# Patient Record
Sex: FEMALE | Race: WHITE | Employment: OTHER | URBAN - METROPOLITAN AREA
[De-identification: names, ages, dates, MRNs, and addresses within clinical notes are randomized per-mention and may not be internally consistent; named-entity substitution may affect disease eponyms.]

---

## 2017-01-19 PROBLEM — M79.7 FIBROMYALGIA: Status: ACTIVE | Noted: 2017-01-19

## 2017-01-19 PROBLEM — F01.50 VASCULAR DEMENTIA (HCC): Status: ACTIVE | Noted: 2017-01-19

## 2017-01-19 PROBLEM — N32.81 OAB (OVERACTIVE BLADDER): Status: ACTIVE | Noted: 2017-01-19

## 2017-01-19 PROBLEM — G47.00 INSOMNIA: Status: ACTIVE | Noted: 2017-01-19

## 2017-01-19 PROBLEM — F41.9 ANXIETY DISORDER: Status: ACTIVE | Noted: 2017-01-19

## 2017-03-21 PROBLEM — M10.9 PODAGRA: Status: ACTIVE | Noted: 2017-03-21

## 2017-03-21 PROBLEM — E53.8 B12 DEFICIENCY: Status: ACTIVE | Noted: 2017-03-21

## 2017-04-24 PROBLEM — M10.9 PODAGRA: Status: RESOLVED | Noted: 2017-03-21 | Resolved: 2017-04-24

## 2017-07-27 PROBLEM — K58.9 IBS (IRRITABLE BOWEL SYNDROME): Status: ACTIVE | Noted: 2017-07-27

## 2017-07-27 PROBLEM — R11.0 NAUSEA: Status: ACTIVE | Noted: 2017-07-27

## 2017-09-14 PROBLEM — K52.9 COLITIS: Status: ACTIVE | Noted: 2017-09-14

## 2017-10-03 PROBLEM — K59.00 CONSTIPATION: Status: ACTIVE | Noted: 2017-10-03

## 2017-10-26 PROBLEM — N39.46 MIXED STRESS AND URGE URINARY INCONTINENCE: Status: ACTIVE | Noted: 2017-10-26

## 2018-04-27 ENCOUNTER — HOSPITAL ENCOUNTER (OUTPATIENT)
Age: 73
Setting detail: OUTPATIENT SURGERY
Discharge: HOME | End: 2018-04-27
Attending: INTERNAL MEDICINE | Admitting: INTERNAL MEDICINE
Payer: MEDICARE

## 2018-04-27 ENCOUNTER — ANESTHESIA (OUTPATIENT)
Dept: ENDOSCOPY | Age: 73
End: 2018-04-27
Payer: MEDICARE

## 2018-04-27 ENCOUNTER — ANESTHESIA EVENT (OUTPATIENT)
Dept: ENDOSCOPY | Age: 73
End: 2018-04-27
Payer: MEDICARE

## 2018-04-27 VITALS
RESPIRATION RATE: 18 BRPM | HEART RATE: 89 BPM | OXYGEN SATURATION: 98 % | WEIGHT: 146 LBS | SYSTOLIC BLOOD PRESSURE: 134 MMHG | DIASTOLIC BLOOD PRESSURE: 62 MMHG | BODY MASS INDEX: 24.32 KG/M2 | TEMPERATURE: 98.6 F | HEIGHT: 65 IN

## 2018-04-27 PROCEDURE — 88312 SPECIAL STAINS GROUP 1: CPT | Performed by: INTERNAL MEDICINE

## 2018-04-27 PROCEDURE — 74011000250 HC RX REV CODE- 250

## 2018-04-27 PROCEDURE — 76040000025: Performed by: INTERNAL MEDICINE

## 2018-04-27 PROCEDURE — 77030009426 HC FCPS BIOP ENDOSC BSC -B: Performed by: INTERNAL MEDICINE

## 2018-04-27 PROCEDURE — 88305 TISSUE EXAM BY PATHOLOGIST: CPT | Performed by: INTERNAL MEDICINE

## 2018-04-27 PROCEDURE — 76060000032 HC ANESTHESIA 0.5 TO 1 HR: Performed by: INTERNAL MEDICINE

## 2018-04-27 PROCEDURE — 74011250636 HC RX REV CODE- 250/636: Performed by: ANESTHESIOLOGY

## 2018-04-27 PROCEDURE — 74011250636 HC RX REV CODE- 250/636

## 2018-04-27 RX ORDER — PROPOFOL 10 MG/ML
INJECTION, EMULSION INTRAVENOUS
Status: DISCONTINUED | OUTPATIENT
Start: 2018-04-27 | End: 2018-04-27 | Stop reason: HOSPADM

## 2018-04-27 RX ORDER — LIDOCAINE HYDROCHLORIDE 20 MG/ML
INJECTION, SOLUTION EPIDURAL; INFILTRATION; INTRACAUDAL; PERINEURAL AS NEEDED
Status: DISCONTINUED | OUTPATIENT
Start: 2018-04-27 | End: 2018-04-27 | Stop reason: HOSPADM

## 2018-04-27 RX ORDER — SODIUM CHLORIDE 0.9 % (FLUSH) 0.9 %
5-10 SYRINGE (ML) INJECTION AS NEEDED
Status: CANCELLED | OUTPATIENT
Start: 2018-04-27

## 2018-04-27 RX ORDER — SODIUM CHLORIDE, SODIUM LACTATE, POTASSIUM CHLORIDE, CALCIUM CHLORIDE 600; 310; 30; 20 MG/100ML; MG/100ML; MG/100ML; MG/100ML
100 INJECTION, SOLUTION INTRAVENOUS CONTINUOUS
Status: DISCONTINUED | OUTPATIENT
Start: 2018-04-27 | End: 2018-04-27 | Stop reason: HOSPADM

## 2018-04-27 RX ORDER — SODIUM CHLORIDE, SODIUM LACTATE, POTASSIUM CHLORIDE, CALCIUM CHLORIDE 600; 310; 30; 20 MG/100ML; MG/100ML; MG/100ML; MG/100ML
100 INJECTION, SOLUTION INTRAVENOUS CONTINUOUS
Status: CANCELLED | OUTPATIENT
Start: 2018-04-27

## 2018-04-27 RX ORDER — PROPOFOL 10 MG/ML
INJECTION, EMULSION INTRAVENOUS AS NEEDED
Status: DISCONTINUED | OUTPATIENT
Start: 2018-04-27 | End: 2018-04-27 | Stop reason: HOSPADM

## 2018-04-27 RX ADMIN — PROPOFOL 120 MCG/KG/MIN: 10 INJECTION, EMULSION INTRAVENOUS at 07:15

## 2018-04-27 RX ADMIN — SODIUM CHLORIDE, SODIUM LACTATE, POTASSIUM CHLORIDE, AND CALCIUM CHLORIDE 100 ML/HR: 600; 310; 30; 20 INJECTION, SOLUTION INTRAVENOUS at 07:04

## 2018-04-27 RX ADMIN — PROPOFOL 20 MG: 10 INJECTION, EMULSION INTRAVENOUS at 07:14

## 2018-04-27 RX ADMIN — LIDOCAINE HYDROCHLORIDE 60 MG: 20 INJECTION, SOLUTION EPIDURAL; INFILTRATION; INTRACAUDAL; PERINEURAL at 07:13

## 2018-04-27 NOTE — ANESTHESIA PREPROCEDURE EVALUATION
Anesthetic History     PONV          Review of Systems / Medical History  Patient summary reviewed, nursing notes reviewed and pertinent labs reviewed    Pulmonary    COPD: moderate    Sleep apnea           Neuro/Psych     seizures: well controlled    Psychiatric history and dementia     Cardiovascular    Hypertension: well controlled          CAD    Exercise tolerance: <4 METS  Comments: MVP per patient    Echo from 2016 showed mild LVH, mild diastolic dysfunction, normal LVEF and valve function   GI/Hepatic/Renal     GERD: well controlled           Endo/Other        Arthritis     Other Findings            Physical Exam    Airway  Mallampati: II  TM Distance: 4 - 6 cm  Neck ROM: normal range of motion   Mouth opening: Normal     Cardiovascular    Rhythm: regular           Dental    Dentition: Edentulous     Pulmonary    Rhonchi    Wheezes         Abdominal         Other Findings            Anesthetic Plan    ASA: 3  Anesthesia type: total IV anesthesia

## 2018-04-27 NOTE — ANESTHESIA POSTPROCEDURE EVALUATION
Post-Anesthesia Evaluation and Assessment    Patient: Radha Mccormick MRN: [id number]  SSN: [id number]    YOB: 1945  Age: 72 y.o. Sex: female       Cardiovascular Function/Vital Signs  Visit Vitals    /66    Pulse 87    Temp 37 °C (98.6 °F)    Resp 16    Ht 5' 5\" (1.651 m)    Wt 66.2 kg (146 lb)    SpO2 97%    BMI 24.3 kg/m2       Patient is status post total IV anesthesia anesthesia for Procedure(s):  ESOPHAGOGASTRODUODENOSCOPY (EGD)  COLONOSCOPY BMI 24  ESOPHAGOGASTRODUODENAL (EGD) BIOPSY  COLON BIOPSY. Nausea/Vomiting: None    Postoperative hydration reviewed and adequate. Pain:  Pain Scale 1: Numeric (0 - 10) (04/27/18 0640)  Pain Intensity 1: 6 (04/27/18 0640)   Managed    Neurological Status: At baseline    Mental Status and Level of Consciousness: Arousable    Pulmonary Status:   O2 Device: Nasal cannula (04/27/18 0737)   Adequate oxygenation and airway patent    Complications related to anesthesia: None    Post-anesthesia assessment completed.  No concerns    Signed By: Arabella Tsai MD     April 27, 2018

## 2018-04-27 NOTE — DISCHARGE INSTRUCTIONS
Gastrointestinal Esophagogastroduodenoscopy (EGD) - Upper Exam Discharge Instructions    1. Call Dr. Coretta Newell at [phone] for any problems or questions. 2. Contact the doctor's office for follow up appointment as directed. 3. Medication may cause drowsiness for several hours, therefore, do not drive or operate machinery for remainder of the day. 4. No alcohol today. 5. Ordinarily, you may resume regular diet and activity after exam unless otherwise specified by your physician. 6. For mild soreness in your throat you may use Cepacol throat lozenges or warm salt-water gargles as needed. Any additional instructions:      - Follow up pathology and treat H pylori if positive. - Avoid ALL NSAIDs, Aspirin, Aleve, Advil, Ibuprofen. - Increase Nexium to BID x 8wks, then back to daily. Instructions given to [facility] and other family members. Instructions given by:  Dez Delgado RN              Gastrointestinal Colonoscopy/Flexible Sigmoidoscopy - Lower Exam Discharge Instructions  1. Call Dr. Coretta Newell at [phone] for any problems or questions. 2. Contact the doctors office for follow up appointment as directed  3. Medication may cause drowsiness for several hours, therefore, do not drive or operate machinery for remainder of the day. 4. No alcohol today. 5. Ordinarily, you may resume regular diet and activity after exam unless otherwise specified by your physician. 6. Because of air put into your colon during exam, you may experience some abdominal distension, relieved by the passage of gas, for several hours. 7. Contact your physician if you have any of the following:  a. Excessive amount of bleeding - large amount when having a bowel movement. Occasional specks of blood with bowel movement would not be unusual.  b. Severe abdominal pain  c.  Fever or Chills    Any additional instructions:      - Levaquin x 14 days  - After Antibiotics, start prednisone taper x 3 wks  - Will get CT after prednisone taper with OV to follow  - Low residue diet  - Follow up pathology    Instructions given to Justinesara Smiley and other family members.   Instructions given by:  Kun Frost RN

## 2018-04-27 NOTE — PROCEDURES
DATE OF PROCEDURE: 4/27/18    REQUESTING PHYSICIAN: Dr Anabel Galo: Esophagogastroduodenoscopy. ENDOSCOPIST: Dorys Almendarez M.D. PREOPERATIVE DIAGNOSIS: Nausea, Epigastric pain    POSTOPERATIVE DIAGNOSIS: Gastritis with multiple ulcers    INSTRUMENTS: GIF H190    SEDATION: MAC    DESCRIPTION: After informed consent was obtained, the patient was taken to the endoscopy suite and placed in the left lateral decubitus position. Intravenous sedation was administered as above and posterior pharynx was anesthetized with local anesthetic spray. After adequate sedation had been achieved the endoscope was inserted over the tongue and through the posterior pharynx under direct visualization down the esophagus to the stomach and into the second portion of the duodenum. The endoscopic was withdrawn from that point, performing a careful survey of the mucosa. Retroflexion was performed in the gastric fundus. FINDINGS:  Esophagus: Normal appearing esophageal mucosa. Stomach: Normal appearing proximal mucosa. There was moderate gastritis in the antrum with multiple shallow ulcerations. Bx's taken. Duodenum: Normal duodenal mucosa. Estimated blood loss:  0 cc-minimal    IMPRESSION:   Gastritis with gastric ulcers    PLAN:  - F/u path and treat H pylori if +  - Avoid ALL NSAIDs  - Increase Nexium to BID x 8wks, then back to daily  - Proceed to colo        PROCEDURE: Colonoscopy. PREOPERATIVE DIAGNOSIS: Diarrhea, Colitis seen on CT, FHx of Crohn's, RLQ pain    POSTOPERATIVE DIAGNOSIS: Rectosigmoid stricture    SEDATION: MAC    INSTRUMENT: PCF H190    DESCRIPTION OF PROCEDURE:  After informed consent was obtained the patient was placed in the left lateral decubitus position. Intravenous sedation was administered as above. After adequate sedation had been achieved, digital rectal examination was performed. The colonoscope was then inserted through the anus and followed the course of the rectum and sigmoid. The colonoscope was withdrawn from that point, performing a careful survey of the mucosa. Retroflexion was performed in the rectum. FINDINGS:  Normal rectal mucosa. There was a tight, ulcerated, inflamed stricture at the rectosigmoid junction. The peds scope couldn't traverse. I switched to an upper scope which likely could have traversed but it was so friable and inflamed, the perforation risk was very high. Therefore, bx's were taken and the procedure was aborted.      Estimated Blood Loss:  0 cc-min    IMPRESSION:  Rectosigmoid stricture     PLAN:  - Levaquin x 14 days  - After Abx, start prednisone taper x 3 wks  - Will get CT after prednisone taper with OV to follow  - Low residue diet  - F/u path    P Svetlana Mazariegos MD

## 2018-04-27 NOTE — IP AVS SNAPSHOT
[address] 
[phone] Patient: Vianney High MRN: [id number] MIKE:[id number] About your hospitalization You were admitted on:  April 27, 2018 You last received care in the:  [facility] You were discharged on:  April 27, 2018 Why you were hospitalized Your primary diagnosis was:  Not on File Follow-up Information None Your Scheduled Appointments Friday May 11, 2018  4:30 PM EDT Office Visit with Lakshmi Gunter MD  
[facility] CARDIOLOGY [place] OFFICE ([address] [place] [address]) [address] [place] [address]  
[phone] Discharge Orders None A check diego indicates which time of day the medication should be taken. My Medications ASK your doctor about these medications Instructions Each Dose to Equal  
 Morning Noon Evening Bedtime  
 amLODIPine 5 mg tablet Commonly known as:  Cathleen Calhoun Your last dose was: Your next dose is: TAKE 1 TABLET DAILY  
     
   
   
   
  
 aspirin delayed-release 81 mg tablet Your last dose was: Your next dose is: Take  by mouth every evening. BENADRYL ALLERGY 25 mg tablet Generic drug:  diphenhydrAMINE Your last dose was: Your next dose is: Take 25 mg by mouth every six (6) hours as needed for Sleep. 25 mg  
    
   
   
   
  
 benzonatate 200 mg capsule Commonly known as:  TESSALON Your last dose was: Your next dose is: Take 1 Cap by mouth three (3) times daily as needed for Cough. Indications: Cough 200 mg  
    
   
   
   
  
 brief disposable Misc Commonly known as:  adult Your last dose was: Your next dose is:    
   
   
 by Does Not Apply route. Large Disposable Gloves Misc Commonly known as:  NITRILE EXAM GLOVES  
   
 Your last dose was: Your next dose is:    
   
   
 2 Each by Does Not Apply route five (5) times daily. Large 2 Each DULoxetine 60 mg capsule Commonly known as:  CYMBALTA Your last dose was: Your next dose is: Take 1 Cap by mouth two (2) times a day. 60 mg  
    
   
   
   
  
 EPIPEN 2-ABHILASH 0.3 mg/0.3 mL injection Generic drug:  EPINEPHrine Your last dose was: Your next dose is:    
   
   
      
   
   
   
  
 folic acid 1 mg tablet Commonly known as:  Google Your last dose was: Your next dose is: Take 1 Tab by mouth daily. 1 mg  
    
   
   
   
  
 furosemide 20 mg tablet Commonly known as:  LASIX Your last dose was: Your next dose is: Take 1 Tab by mouth daily. 20 mg  
    
   
   
   
  
 ibuprofen 800 mg tablet Commonly known as:  MOTRIN Your last dose was: Your next dose is: Take 1 Tab by mouth every eight (8) hours as needed for Pain. 800 mg LORazepam 0.5 mg tablet Commonly known as:  ATIVAN Your last dose was: Your next dose is: Take 1 Tab by mouth two (2) times daily as needed for Anxiety. Max Daily Amount: 1 mg. 0.5 mg  
    
   
   
   
  
 memantine ER 28 mg capsule Commonly known as:  NAMENDA XR Your last dose was: Your next dose is: Take 1 Cap by mouth daily. 28 mg Omega-3 Fatty Acids 60- mg Cpdr  
   
Your last dose was: Your next dose is: Take 1 Cap by mouth daily. 1 Cap  
    
   
   
   
  
 omeprazole 40 mg capsule Commonly known as:  PRILOSEC Your last dose was: Your next dose is: Take 1 Cap by mouth daily. 1 daily 40 mg  
    
   
   
   
  
 ondansetron hcl 4 mg tablet Commonly known as:  Aiden Borjas Your last dose was: Your next dose is: Take 1 Tab by mouth every eight (8) hours as needed for Nausea. 4 mg  
    
   
   
   
  
 oxybutynin 5 mg tablet Commonly known as:  NVEZKMTP Your last dose was: Your next dose is: Take 1 Tab by mouth nightly. 5 mg  
    
   
   
   
  
 oxyCODONE IR 15 mg immediate release tablet Commonly known as:  OXY-IR Your last dose was: Your next dose is:    
   
   
 1/2 TO 1 TAB TID  
     
   
   
   
  
 potassium chloride SR 20 mEq tablet Commonly known as:  K-TAB Your last dose was: Your next dose is: Take 1 Tab by mouth two (2) times a day. 20 mEq  
    
   
   
   
  
 predniSONE 5 mg tablet Commonly known as:  Harvey Fast Your last dose was: Your next dose is: Take 1 Tab by mouth daily. 5 mg  
    
   
   
   
  
 pregabalin 150 mg capsule Commonly known as:  Mike [place] Your last dose was: Your next dose is: Take 1 Cap by mouth three (3) times daily. Max Daily Amount: 450 mg.  
 150 mg PROBIOTIC 4X 10-15 mg Tbec Generic drug:  B.infantis-B.ani-B.long-B.bifi Your last dose was: Your next dose is: Take  by mouth. rosuvastatin 20 mg tablet Commonly known as:  CRESTOR Your last dose was: Your next dose is: Take 1 Tab by mouth nightly. 20 mg  
    
   
   
   
  
 SINGULAIR 10 mg tablet Generic drug:  montelukast  
   
Your last dose was: Your next dose is: Take 10 mg by mouth every evening. 10 mg  
    
   
   
   
  
 temazepam 15 mg capsule Commonly known as:  RESTORIL Your last dose was: Your next dose is: Take 1 Cap by mouth nightly as needed for Sleep. Max Daily Amount: 15 mg.  
 15 mg  
    
   
   
   
  
 underpads 2.6 X 2.9 feet Pads Commonly known as:  [address] Your last dose was: Your next dose is:    
   
   
 1 Each by Does Not Apply route four (4) times daily. 1 Each VITAMIN B-12 1,000 mcg tablet Generic drug:  cyanocobalamin Your last dose was: Your next dose is: Take 1,000 mcg by mouth daily. 1000 mcg  
    
   
   
   
  
 zolpidem 5 mg tablet Commonly known as:  AMBIEN Your last dose was: Your next dose is:    
   
   
 TK 1 T PO QD HS Opioid Education Prescription Opioids: What You Need to Know: 
 
Prescription opioids can be used to help relieve moderate-to-severe pain and are often prescribed following a surgery or injury, or for certain health conditions. These medications can be an important part of treatment but also come with serious risks. Opioids are strong pain medicines. Examples include hydrocodone, oxycodone, fentanyl, and morphine. Heroin is an example of an illegal opioid. It is important to work with your health care provider to make sure you are getting the safest, most effective care. WHAT ARE THE RISKS AND SIDE EFFECTS OF OPIOID USE? Prescription opioids carry serious risks of addiction and overdose, especially with prolonged use. An opioid overdose, often marked by slow breathing, can cause sudden death. The use of prescription opioids can have a number of side effects as well, even when taken as directed. · Tolerance-meaning you might need to take more of a medication for the same pain relief · Physical dependence-meaning you have symptoms of withdrawal when the medication is stopped. Withdrawal symptoms can include nausea, sweating, chills, diarrhea, stomach cramps, and muscle aches. Withdrawal can last up to several weeks, depending on which drug you took and how long you took it. · Increased sensitivity to pain · Constipation · Nausea, vomiting, and dry mouth · Sleepiness and dizziness · Confusion · Depression · Low levels of testosterone that can result in lower sex drive, energy, and strength · Itching and sweating RISKS ARE GREATER WITH:      
· History of drug misuse, substance use disorder, or overdose · Mental health conditions (such as depression or anxiety) · Sleep apnea · Older age (72 years or older) · Pregnancy Avoid alcohol while taking prescription opioids. Also, unless specifically advised by your health care provider, medications to avoid include: · Benzodiazepines (such as Xanax or Valium) · Muscle relaxants (such as Soma or Flexeril) · Hypnotics (such as Ambien or Lunesta) · Other prescription opioids KNOW YOUR OPTIONS Talk to your health care provider about ways to manage your pain that don't involve prescription opioids. Some of these options may actually work better and have fewer risks and side effects. Options may include: 
· Pain relievers such as acetaminophen, ibuprofen, and naproxen · Some medications that are also used for depression or seizures · Physical therapy and exercise · Counseling to help patients learn how to cope better with triggers of pain and stress. · Application of heat or cold compress · Massage therapy · Relaxation techniques Be Informed Make sure you know the name of your medication, how much and how often to take it, and its potential risks & side effects. IF YOU ARE PRESCRIBED OPIOIDS FOR PAIN: 
· Never take opioids in greater amounts or more often than prescribed. Remember the goal is not to be pain-free but to manage your pain at a tolerable level. · Follow up with your primary care provider to: · Work together to create a plan on how to manage your pain. · Talk about ways to help manage your pain that don't involve prescription opioids. · Talk about any and all concerns and side effects. · Help prevent misuse and abuse. · Never sell or share prescription opioids · Help prevent misuse and abuse. · Store prescription opioids in a secure place and out of reach of others (this may include visitors, children, friends, and family). · Safely dispose of unused/unwanted prescription opioids: Find your community drug take-back program or your pharmacy mail-back program, or flush them down the toilet, following guidance from the Food and Drug Administration (www.fda.gov/Drugs/ResourcesForYou). · Visit www.cdc.gov/drugoverdose to learn about the risks of opioid abuse and overdose. · If you believe you may be struggling with addiction, tell your health care provider and ask for guidance or call [employer] at 9-718-199-HELP. Discharge Instructions Gastrointestinal Esophagogastroduodenoscopy (EGD) - Upper Exam Discharge Instructions 1. Call Dr. Akin Perez at [phone] for any problems or questions. 2. Contact the doctor's office for follow up appointment as directed. 3. Medication may cause drowsiness for several hours, therefore, do not drive or operate machinery for remainder of the day. 4. No alcohol today. 5. Ordinarily, you may resume regular diet and activity after exam unless otherwise specified by your physician. 6. For mild soreness in your throat you may use Cepacol throat lozenges or warm salt-water gargles as needed. Any additional instructions:   
 
- Follow up pathology and treat H pylori if positive. - Avoid ALL NSAIDs, Aspirin, Aleve, Advil, Ibuprofen. - Increase Nexium to BID x 8wks, then back to daily. Instructions given to Jhonny Sneed and other family members. Instructions given by:  Yolanda Schmidt RN Gastrointestinal Colonoscopy/Flexible Sigmoidoscopy - Lower Exam Discharge Instructions 1. Call Dr. Akin Perez at [phone] for any problems or questions. 2. Contact the doctors office for follow up appointment as directed 3. Medication may cause drowsiness for several hours, therefore, do not drive or operate machinery for remainder of the day. 4. No alcohol today. 5. Ordinarily, you may resume regular diet and activity after exam unless otherwise specified by your physician. 6. Because of air put into your colon during exam, you may experience some abdominal distension, relieved by the passage of gas, for several hours. 7. Contact your physician if you have any of the following: 
a. Excessive amount of bleeding  large amount when having a bowel movement. Occasional specks of blood with bowel movement would not be unusual. 
b. Severe abdominal pain 
c. Fever or Chills Any additional instructions:   
 
- Levaquin x 14 days - After Antibiotics, start prednisone taper x 3 wks - Will get CT after prednisone taper with OV to follow - Low residue diet - Follow up pathology Instructions given to Radha Mccormick and other family members. Instructions given by:  Marilyn Peralta RN 
 
 
[facility] Transitions of Care [address] offers a voluntary care coordination program to provide high quality service and care to Ilan Barrett fee-for-service beneficiaries. [facility] was designed to help you enhance your health and well-being through the following services: ? Transitions of Care  support for individuals who are transitioning from one care setting to another (example: Hospital to home). ? Chronic and Complex Care Coordination  support for individuals and caregivers of those with serious or chronic illnesses or with more than one chronic (ongoing) condition and those who take a number of different medications. If you meet specific medical criteria, a [address] [place] [address] Rd may call you directly to coordinate your care with your primary care physician and your other care providers. For questions about the [facility] programs, please, contact your physicians office. For general questions or additional information about Accountable Care Organizations: 
Please visit www.medicare.gov/acos. html or call 1-800-MEDICARE (9-354.979.1158) TTY users should call 7-968.384.2149. Introducing [facility]! Dear Mendel Grimes: Thank you for requesting a [employer] account. Our records indicate that you already have an active [employer] account. You can access your account anytime at [email or web address] Did you know that you can access your hospital and ER discharge instructions at any time in [employer]? You can also review all of your test results from your hospital stay or ER visit. Additional Information If you have questions, please visit the Frequently Asked Questions section of the [employer] website at [email or web address] Remember, [employer] is NOT to be used for urgent needs. For medical emergencies, dial 911. Now available from your iPhone and Android! Introducing Artur Wilkinson As a [facility] Oka patient, I wanted to make you aware of our electronic visit tool called Artur Franciscoandrewfin. [facility] 24/7 allows you to connect within minutes with a medical provider 24 hours a day, seven days a week via a mobile device or tablet or logging into a secure website from your computer. You can access Artur Wilkinson from anywhere in the [place]. A virtual visit might be right for you when you have a simple condition and feel like you just dont want to get out of bed, or cant get away from work for an appointment, when your regular [facility] provider is not available (evenings, weekends or holidays), or when youre out of town and need minor care.   Electronic visits cost only $49 and if the Artur Jaja provider determines a prescription is needed to treat your condition, one can be electronically transmitted to a nearby pharmacy*. Please take a moment to enroll today if you have not already done so. The enrollment process is free and takes just a few minutes. To enroll, please download the [employer] 24/7 tavares to your tablet or phone, or visit [email or web address] to enroll on your computer. And, as an [address] [place] [address] patient with a STYL[employer] account, the results of your visits will be scanned into your electronic medical record and your primary care provider will be able to view the scanned results. We urge you to continue to see your regular [employer] provider for your ongoing medical care. And while your primary care provider may not be the one available when you seek a [employer] virtual visit, the peace of mind you get from getting a real diagnosis real time can be priceless. For more information on [employer] vascular solution, view our Frequently Asked Questions (FAQs) at [email or web address]. Sincerely, 
 
Shahla Saba MD 
Chief Medical Officer [employer] *:  certain medications cannot be prescribed via [employer] vascular solution Providers Seen During Your Hospitalization Provider Specialty Primary office phone Sathish Ashford MD Gastroenterology [phone] Your Primary Care Physician (PCP) Primary Care Physician Office Phone Office Fax [address] [facility], [address] [phone] You are allergic to the following Allergen Reactions Coconut Swelling  
 fresh coconut Codeine Unable to Obtain Erythromycin Other (comments) Honey Swelling Lortab (Hydrocodone-Acetaminophen) Itching Nasonex (Mometasone) Other (comments) Cause nose bleed Novocain (Procaine) Swelling Penicillins Hives Sulfa (Sulfonamide Antibiotics) Other (comments) Tramadol Itching Recent Documentation Height Weight BMI OB Status Smoking Status 1.651 m 66.2 kg 24.3 kg/m2 Hysterectomy Current Every Day Smoker Emergency Contacts Name Discharge Info Relation Home Work Mobile Debra Hunt  Daughter [21] [phone] Marcia Sanchez Home DISCHARGE CAREGIVER [3] Patient Belongings The following personal items are in your possession at time of discharge: 
  Dental Appliances: Uppers  Visual Aid: Glasses Discharge Instructions Attachments/References LOW-FIBER DIET (ENGLISH) Patient Handouts Low-Fiber Diet: Care Instructions Your Care Instructions When your bowels are irritated, you may need to limit fiber in your diet until the problem clears up. Your doctor and dietitian can help you design a low-fiber diet based on your health and what you prefer to eat. Ask your doctor how long you should stay on a low-fiber diet. Your doctor probably will have you start adding more fiber to your diet as you get better. Always talk with your doctor or dietitian before you make changes in your diet. Follow-up care is a key part of your treatment and safety. Be sure to make and go to all appointments, and call your doctor if you are having problems. It's also a good idea to know your test results and keep a list of the medicines you take. How can you care for yourself at home? · Choose white or refined grains, and avoid whole grains. That means eating white or refined cereals, breads, crackers, rice, or pasta. · Peel the skin from fruits and vegetables before you eat or cook them. Avoid eating skins, seeds, and hulls. ¨ Eat frozen or canned fruit. Low-fiber fruits include applesauce, ripe bananas, and fruit juice without pulp. ¨ Eat low-fiber vegetables, which include well-cooked vegetables and vegetable juice.  
· Drink or eat milk, yogurt, or other milk products, if you can digest dairy without too many problems. Your doctor may limit milk and milk products for a while. If so, he or she may recommend a calcium and vitamin D supplement. · Eat well-cooked meat, such as chicken, turkey, fish, or lean meat. You also can eat eggs and tofu. · Avoid these foods: 
¨ Bran, brown or wild rice, oatmeal, granola, corn, gabby crackers, barley, and whole wheat and other whole-grain breads, such as rye bread ¨ Cereals with more than 3 grams of fiber a serving ¨ Berries, rhubarb, prunes, prune juice, and all dried fruits ¨ Raw vegetables ¨ Cabbage, broccoli, brussels sprouts, and cauliflower ¨ Cooked dried beans, lentils, and split peas ¨ Crunchy peanut butter ¨ Ice cream with fruit pieces in it ¨ Coconut, nuts, popcorn, raisins, and seeds, or any ice cream, yogurt, or cheese with these in them Where can you learn more? Go to http://matty-kamilah.info/. Enter L431 in the search box to learn more about \"Low-Fiber Diet: Care Instructions. \" Current as of: May 12, 2017 Content Version: 11.4 © 1955-0220 [employer]. Care instructions adapted under license by [employer] (which disclaims liability or warranty for this information). If you have questions about a medical condition or this instruction, always ask your healthcare professional. [address] any warranty or liability for your use of this information. Please provide this summary of care documentation to your next provider. Signatures-by signing, you are acknowledging that this After Visit Summary has been reviewed with you and you have received a copy. Patient Signature:  ____________________________________________________________ Date:  ____________________________________________________________  
  
[facility] Provider Signature:  ____________________________________________________________ Date:  ____________________________________________________________

## 2018-08-30 PROBLEM — Z79.899 ENCOUNTER FOR LONG-TERM (CURRENT) USE OF MEDICATIONS: Status: ACTIVE | Noted: 2018-08-30

## 2019-04-29 PROBLEM — K52.9 COLITIS: Status: RESOLVED | Noted: 2017-09-14 | Resolved: 2019-04-29

## 2019-12-31 PROBLEM — G47.9 SLEEP DISORDER: Status: ACTIVE | Noted: 2019-12-31

## 2019-12-31 PROBLEM — R53.81 DEBILITY: Status: ACTIVE | Noted: 2019-12-31

## 2022-03-18 PROBLEM — F01.50 VASCULAR DEMENTIA (HCC): Status: ACTIVE | Noted: 2017-01-19

## 2022-03-18 PROBLEM — G47.9 SLEEP DISORDER: Status: ACTIVE | Noted: 2019-12-31

## 2022-03-18 PROBLEM — N39.46 MIXED STRESS AND URGE URINARY INCONTINENCE: Status: ACTIVE | Noted: 2017-10-26

## 2022-03-19 PROBLEM — E53.8 B12 DEFICIENCY: Status: ACTIVE | Noted: 2017-03-21

## 2022-03-19 PROBLEM — R53.81 DEBILITY: Status: ACTIVE | Noted: 2019-12-31

## 2022-03-19 PROBLEM — N32.81 OAB (OVERACTIVE BLADDER): Status: ACTIVE | Noted: 2017-01-19

## 2022-03-19 PROBLEM — K59.00 CONSTIPATION: Status: ACTIVE | Noted: 2017-10-03

## 2022-03-19 PROBLEM — Z79.899 ENCOUNTER FOR LONG-TERM (CURRENT) USE OF MEDICATIONS: Status: ACTIVE | Noted: 2018-08-30

## 2022-03-20 PROBLEM — K58.9 IBS (IRRITABLE BOWEL SYNDROME): Status: ACTIVE | Noted: 2017-07-27

## 2022-03-20 PROBLEM — G47.00 INSOMNIA: Status: ACTIVE | Noted: 2017-01-19

## 2022-03-20 PROBLEM — M79.7 FIBROMYALGIA: Status: ACTIVE | Noted: 2017-01-19

## 2022-03-20 PROBLEM — F41.9 ANXIETY DISORDER: Status: ACTIVE | Noted: 2017-01-19

## 2022-03-20 PROBLEM — R11.0 NAUSEA: Status: ACTIVE | Noted: 2017-07-27

## 2022-06-07 ENCOUNTER — TELEPHONE (OUTPATIENT)
Dept: INTERNAL MEDICINE CLINIC | Facility: CLINIC | Age: 77
End: 2022-06-07

## 2022-06-07 RX ORDER — NITROFURANTOIN 25; 75 MG/1; MG/1
100 CAPSULE ORAL 2 TIMES DAILY
Qty: 10 CAPSULE | Refills: 0 | Status: SHIPPED | OUTPATIENT
Start: 2022-06-07 | End: 2022-06-12

## 2022-06-07 NOTE — TELEPHONE ENCOUNTER
Pt's daughter called and stated that pt is having uti symptoms-burning,frequency and back pain. There are no appointments available until Friday. Pt does not have transportation to go to urgent care or the er. Her daughter would like to know if you are able to send an antibiotic to the pharmacy for her. UTI SYMPTOMS    BURNING? Yes    FEVER? No  If yes, document temperature: N/A    CHILLS? No    NAUSEA? No  VOMITING? No    BLOOD IN URINE? No    BACK PAIN?  Yes    HOW LONG HAVE YOU HAD THE ABOVE SYMPTOMS? 2 days    ALLERGIES: On file  PHARMACY: [facility]  : [id number]

## 2022-06-07 NOTE — TELEPHONE ENCOUNTER
I sent in antibiotics. Eat yogurt daily while on antibiotics. Schedule an office visit to be evaluated if not clearing up.

## 2022-07-03 DIAGNOSIS — M79.7 FIBROMYALGIA: ICD-10-CM

## 2022-07-03 DIAGNOSIS — G89.4 CHRONIC PAIN SYNDROME: Primary | ICD-10-CM

## 2022-07-05 RX ORDER — PREGABALIN 100 MG/1
CAPSULE ORAL
Qty: 90 CAPSULE | Refills: 5 | Status: SHIPPED | OUTPATIENT
Start: 2022-07-05 | End: 2022-08-04

## 2022-07-11 RX ORDER — MONTELUKAST SODIUM 10 MG/1
TABLET ORAL
Qty: 90 TABLET | Refills: 3 | Status: SHIPPED | OUTPATIENT
Start: 2022-07-11

## 2022-07-11 RX ORDER — AMLODIPINE BESYLATE 2.5 MG/1
TABLET ORAL
Qty: 90 TABLET | Refills: 3 | Status: SHIPPED | OUTPATIENT
Start: 2022-07-11

## 2022-07-11 RX ORDER — ROSUVASTATIN CALCIUM 20 MG/1
TABLET, COATED ORAL
Qty: 90 TABLET | Refills: 3 | Status: SHIPPED | OUTPATIENT
Start: 2022-07-11

## 2022-07-14 ENCOUNTER — TELEMEDICINE (OUTPATIENT)
Dept: INTERNAL MEDICINE CLINIC | Facility: CLINIC | Age: 77
End: 2022-07-14
Payer: MEDICARE

## 2022-07-14 ENCOUNTER — TELEPHONE (OUTPATIENT)
Dept: INTERNAL MEDICINE CLINIC | Facility: CLINIC | Age: 77
End: 2022-07-14

## 2022-07-14 DIAGNOSIS — I25.10 CORONARY ARTERY DISEASE INVOLVING NATIVE CORONARY ARTERY OF NATIVE HEART WITHOUT ANGINA PECTORIS: ICD-10-CM

## 2022-07-14 DIAGNOSIS — R53.81 DEBILITY: ICD-10-CM

## 2022-07-14 DIAGNOSIS — J44.9 CHRONIC OBSTRUCTIVE PULMONARY DISEASE, UNSPECIFIED COPD TYPE (HCC): Primary | ICD-10-CM

## 2022-07-14 DIAGNOSIS — F41.9 ANXIETY DISORDER, UNSPECIFIED TYPE: ICD-10-CM

## 2022-07-14 DIAGNOSIS — F01.50 VASCULAR DEMENTIA WITHOUT BEHAVIORAL DISTURBANCE (HCC): ICD-10-CM

## 2022-07-14 DIAGNOSIS — E78.00 PURE HYPERCHOLESTEROLEMIA: ICD-10-CM

## 2022-07-14 PROCEDURE — 99214 OFFICE O/P EST MOD 30 MIN: CPT | Performed by: INTERNAL MEDICINE

## 2022-07-14 PROCEDURE — 1123F ACP DISCUSS/DSCN MKR DOCD: CPT | Performed by: INTERNAL MEDICINE

## 2022-07-14 RX ORDER — BENZONATATE 200 MG/1
200 CAPSULE ORAL 3 TIMES DAILY PRN
Qty: 30 CAPSULE | Refills: 1 | Status: SHIPPED | OUTPATIENT
Start: 2022-07-14

## 2022-07-14 RX ORDER — BUPROPION HYDROCHLORIDE 150 MG/1
150 TABLET ORAL EVERY MORNING
Qty: 30 TABLET | Refills: 5 | Status: SHIPPED | OUTPATIENT
Start: 2022-07-14

## 2022-07-14 RX ORDER — MEMANTINE HYDROCHLORIDE 28 MG/1
28 CAPSULE, EXTENDED RELEASE ORAL DAILY
Qty: 90 CAPSULE | Refills: 3 | Status: SHIPPED | OUTPATIENT
Start: 2022-07-14

## 2022-07-14 ASSESSMENT — ENCOUNTER SYMPTOMS
WHEEZING: 1
COUGH: 1
VOMITING: 0
NAUSEA: 0
SHORTNESS OF BREATH: 1
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
## 2022-07-14 NOTE — TELEPHONE ENCOUNTER
Please call and let her know that I am referring her back to cardiology due to exertional symptoms. Needs to get this schedule asap. Also, remind her she sees me 8/8/22. Needs to see me in the office at that time. Thanks.   Julianna Duncan

## 2022-08-22 ENCOUNTER — TELEPHONE (OUTPATIENT)
Dept: INTERNAL MEDICINE CLINIC | Facility: CLINIC | Age: 77
End: 2022-08-22

## 2022-12-28 ENCOUNTER — TELEPHONE (OUTPATIENT)
Dept: INTERNAL MEDICINE CLINIC | Facility: CLINIC | Age: 77
End: 2022-12-28

## 2022-12-28 NOTE — TELEPHONE ENCOUNTER
Patient granddaughter wanted to avoid hospital and was not aware of appointment for yesterday.  Updated phone number for the future and will see if they can get her to the hospital.

## 2022-12-28 NOTE — TELEPHONE ENCOUNTER
She really needs to be evaluated. I recommend taking her to [facility] to be assessed. She missed a visit in our office yesterday.

## 2022-12-28 NOTE — TELEPHONE ENCOUNTER
TRIAGE:    Uti symptoms, confusion and incontinence    Graeme Malik, granddaughter is calling in about a bladder or a uti infection. It is hard to get her in and out of a car right now. She is wanting to avoid a ER visit. Symptoms are burning, confused more than usual. Not always aware that she is using the bathroom.      Pharmacy  [facility]     Call back number [phone]  Rhea Gong)

## 2023-01-05 RX ORDER — DULOXETIN HYDROCHLORIDE 60 MG/1
CAPSULE, DELAYED RELEASE ORAL
Qty: 90 CAPSULE | Refills: 3 | Status: SHIPPED | OUTPATIENT
Start: 2023-01-05

## 2023-01-06 DIAGNOSIS — G89.4 CHRONIC PAIN SYNDROME: ICD-10-CM

## 2023-01-06 DIAGNOSIS — M79.7 FIBROMYALGIA: ICD-10-CM

## 2023-01-06 RX ORDER — PREGABALIN 100 MG/1
CAPSULE ORAL
Qty: 90 CAPSULE | Refills: 0 | Status: SHIPPED | OUTPATIENT
Start: 2023-01-06 | End: 2023-02-07 | Stop reason: SDUPTHER

## 2023-01-09 RX ORDER — PREDNISONE 1 MG/1
TABLET ORAL
Qty: 90 TABLET | Refills: 0 | Status: SHIPPED | OUTPATIENT
Start: 2023-01-09

## 2023-02-07 DIAGNOSIS — M79.7 FIBROMYALGIA: ICD-10-CM

## 2023-02-07 DIAGNOSIS — G89.4 CHRONIC PAIN SYNDROME: ICD-10-CM

## 2023-02-07 RX ORDER — PREGABALIN 100 MG/1
CAPSULE ORAL
Qty: 90 CAPSULE | Refills: 0 | Status: SHIPPED | OUTPATIENT
Start: 2023-02-07 | End: 2023-03-09 | Stop reason: SDUPTHER

## 2023-02-07 RX ORDER — PREGABALIN 100 MG/1
CAPSULE ORAL
Qty: 90 CAPSULE | OUTPATIENT
Start: 2023-02-07 | End: 2023-03-09

## 2023-02-07 NOTE — TELEPHONE ENCOUNTER
Medication Refill Request      Name of Medication : lyrica       Strength of Medication: 100 mg       Directions: take 1 capsule by mouth 3 times daily as needed for pain       30 day or 90 day supply: 90      Preferred Pharmacy: [facility] in [place]     Additional Information For Provider:

## 2023-02-17 ENCOUNTER — OFFICE VISIT (OUTPATIENT)
Dept: INTERNAL MEDICINE CLINIC | Facility: CLINIC | Age: 78
End: 2023-02-17
Payer: MEDICARE

## 2023-02-17 VITALS
RESPIRATION RATE: 17 BRPM | OXYGEN SATURATION: 93 % | TEMPERATURE: 97.5 F | DIASTOLIC BLOOD PRESSURE: 57 MMHG | SYSTOLIC BLOOD PRESSURE: 115 MMHG | BODY MASS INDEX: 20.16 KG/M2 | HEIGHT: 65 IN | HEART RATE: 92 BPM | WEIGHT: 121 LBS

## 2023-02-17 DIAGNOSIS — M48.061 DEGENERATIVE LUMBAR SPINAL STENOSIS: ICD-10-CM

## 2023-02-17 DIAGNOSIS — I10 ESSENTIAL (PRIMARY) HYPERTENSION: ICD-10-CM

## 2023-02-17 DIAGNOSIS — J44.9 CHRONIC OBSTRUCTIVE PULMONARY DISEASE, UNSPECIFIED COPD TYPE (HCC): ICD-10-CM

## 2023-02-17 DIAGNOSIS — Z79.899 ENCOUNTER FOR LONG-TERM (CURRENT) USE OF MEDICATIONS: ICD-10-CM

## 2023-02-17 DIAGNOSIS — R11.0 NAUSEA: ICD-10-CM

## 2023-02-17 DIAGNOSIS — E78.00 PURE HYPERCHOLESTEROLEMIA: Primary | ICD-10-CM

## 2023-02-17 DIAGNOSIS — E87.6 HYPOKALEMIA: ICD-10-CM

## 2023-02-17 DIAGNOSIS — F17.210 NICOTINE DEPENDENCE, CIGARETTES, UNCOMPLICATED: ICD-10-CM

## 2023-02-17 LAB
BASOPHILS # BLD: 0.1 K/UL (ref 0–0.2)
BASOPHILS NFR BLD: 1 % (ref 0–2)
DIFFERENTIAL METHOD BLD: ABNORMAL
EOSINOPHIL # BLD: 0 K/UL (ref 0–0.8)
EOSINOPHIL NFR BLD: 0 % (ref 0.5–7.8)
ERYTHROCYTE [DISTWIDTH] IN BLOOD BY AUTOMATED COUNT: 15.1 % (ref 11.9–14.6)
HCT VFR BLD AUTO: 45.7 % (ref 35.8–46.3)
HGB BLD-MCNC: 14.6 G/DL (ref 11.7–15.4)
IMM GRANULOCYTES # BLD AUTO: 0.1 K/UL (ref 0–0.5)
IMM GRANULOCYTES NFR BLD AUTO: 1 % (ref 0–5)
LYMPHOCYTES # BLD: 0.8 K/UL (ref 0.5–4.6)
LYMPHOCYTES NFR BLD: 9 % (ref 13–44)
MCH RBC QN AUTO: 29.2 PG (ref 26.1–32.9)
MCHC RBC AUTO-ENTMCNC: 31.9 G/DL (ref 31.4–35)
MCV RBC AUTO: 91.4 FL (ref 82–102)
MONOCYTES # BLD: 0.3 K/UL (ref 0.1–1.3)
MONOCYTES NFR BLD: 4 % (ref 4–12)
NEUTS SEG # BLD: 7.3 K/UL (ref 1.7–8.2)
NEUTS SEG NFR BLD: 85 % (ref 43–78)
NRBC # BLD: 0 K/UL (ref 0–0.2)
PLATELET # BLD AUTO: 256 K/UL (ref 150–450)
PMV BLD AUTO: 10.3 FL (ref 9.4–12.3)
RBC # BLD AUTO: 5 M/UL (ref 4.05–5.2)
WBC # BLD AUTO: 8.5 K/UL (ref 4.3–11.1)

## 2023-02-17 PROCEDURE — 99214 OFFICE O/P EST MOD 30 MIN: CPT | Performed by: NURSE PRACTITIONER

## 2023-02-17 PROCEDURE — 1123F ACP DISCUSS/DSCN MKR DOCD: CPT | Performed by: NURSE PRACTITIONER

## 2023-02-17 PROCEDURE — 3074F SYST BP LT 130 MM HG: CPT | Performed by: NURSE PRACTITIONER

## 2023-02-17 PROCEDURE — 3078F DIAST BP <80 MM HG: CPT | Performed by: NURSE PRACTITIONER

## 2023-02-17 RX ORDER — ONDANSETRON 4 MG/1
4 TABLET, FILM COATED ORAL EVERY 8 HOURS PRN
Qty: 30 TABLET | Refills: 0 | Status: SHIPPED | OUTPATIENT
Start: 2023-02-17

## 2023-02-17 RX ORDER — POTASSIUM CHLORIDE 20 MEQ/1
20 TABLET, EXTENDED RELEASE ORAL DAILY
Qty: 60 TABLET | Refills: 0 | Status: SHIPPED | OUTPATIENT
Start: 2023-02-17 | End: 2023-03-19

## 2023-02-17 RX ORDER — BENZONATATE 200 MG/1
200 CAPSULE ORAL 3 TIMES DAILY PRN
Qty: 30 CAPSULE | Refills: 1 | Status: SHIPPED | OUTPATIENT
Start: 2023-02-17

## 2023-02-17 SDOH — ECONOMIC STABILITY: FOOD INSECURITY: WITHIN THE PAST 12 MONTHS, YOU WORRIED THAT YOUR FOOD WOULD RUN OUT BEFORE YOU GOT MONEY TO BUY MORE.: PATIENT DECLINED

## 2023-02-17 SDOH — ECONOMIC STABILITY: FOOD INSECURITY: WITHIN THE PAST 12 MONTHS, THE FOOD YOU BOUGHT JUST DIDN'T LAST AND YOU DIDN'T HAVE MONEY TO GET MORE.: PATIENT DECLINED

## 2023-02-17 SDOH — ECONOMIC STABILITY: HOUSING INSECURITY
IN THE LAST 12 MONTHS, WAS THERE A TIME WHEN YOU DID NOT HAVE A STEADY PLACE TO SLEEP OR SLEPT IN A SHELTER (INCLUDING NOW)?: PATIENT REFUSED

## 2023-02-17 SDOH — ECONOMIC STABILITY: INCOME INSECURITY: HOW HARD IS IT FOR YOU TO PAY FOR THE VERY BASICS LIKE FOOD, HOUSING, MEDICAL CARE, AND HEATING?: PATIENT DECLINED

## 2023-02-17 ASSESSMENT — ENCOUNTER SYMPTOMS
ABDOMINAL PAIN: 0
BACK PAIN: 1
COUGH: 1
SHORTNESS OF BREATH: 1
WHEEZING: 1

## 2023-02-17 NOTE — PROGRESS NOTES
2/17/2023 12:07 PM  Location:[facility] [address] [facility]  [place]  Patient #:  [id number]  YOB: 1945      History of Present Illness     Chief Complaint   Patient presents with    Follow-up     9 month follow up:     Hypertension    Cholesterol Problem       Ms. Alejandro Abdi is a 78 y.o. female  who presents for htn and hld follow up. She has not been to the office in approx 9 months. Hx of cad,  copd, takes chronic prednisone, sees pulmonology, dr Fawn Hager, she is a smoker x 60 yrs and smokes approx 1 ppd. Wellbutrin did not help with cessation so she stopped taking. Tried nicotine patches and chantix with no success. Hx of vascular dementia as well as spinal stenosis/ddd and compression fx of lumbar spine. Reviewed CT of L spine from 2019 to confirm findings. She becomes extremely sob with walking, and reports leg weakness walking more than 20 feet and is requesting a standard wheelchair. She also states hx of bilateral knee arthritis. She reports bps at home have been wnl/like we have in office today. Request refill on zofran (nausea with taking her medications), tessalon perrles (chronic cough from copd) , and potassium.           Allergies   Allergen Reactions    Codeine Other (See Comments)    Erythromycin Other (See Comments)    Honey Swelling    Hydrocodone-Acetaminophen Itching    Mometasone Other (See Comments)     Cause nose bleed    Penicillins Hives    Procaine Swelling    Sulfa Antibiotics Other (See Comments)    Tramadol Itching        Current Outpatient Medications   Medication Sig Dispense Refill    benzonatate (TESSALON) 200 MG capsule Take 1 capsule by mouth 3 times daily as needed for Cough TAKE 1 CAPSULE BY MOUTH THREE TIMES DAILY AS NEEDED FOR COUGH 30 capsule 1    ondansetron (ZOFRAN) 4 MG tablet Take 1 tablet by mouth every 8 hours as needed for Nausea 30 tablet 0    potassium chloride (KLOR-CON M) 20 MEQ extended release tablet Take 1 tablet by mouth daily TAKE 1 TABLET TWICE A DAY 60 tablet 0    pregabalin (LYRICA) 100 MG capsule TAKE 1 CAPSULE BY MOUTH THREE TIMES DAILY AS NEEDED FOR PAIN. MAX DAILY AMOUNT: 300 MG 90 capsule 0    predniSONE (DELTASONE) 5 MG tablet TAKE 1 TABLET BY MOUTH DAILY 90 tablet 0    DULoxetine (CYMBALTA) 60 MG extended release capsule TAKE 1 CAPSULE BY MOUTH DAILY 90 capsule 3    memantine ER (NAMENDA XR) 28 MG CP24 extended release capsule Take 1 capsule by mouth daily TAKE 1 CAPSULE DAILY 90 capsule 3    montelukast (SINGULAIR) 10 MG tablet TAKE 1 TABLET BY MOUTH DAILY 90 tablet 3    amLODIPine (NORVASC) 2.5 MG tablet TAKE 1 TABLET BY MOUTH DAILY 90 tablet 3    rosuvastatin (CRESTOR) 20 MG tablet TAKE 1 TABLET BY MOUTH DAILY 90 tablet 3    aspirin 81 MG EC tablet Take 81 mg by mouth every evening      albuterol-ipratropium (COMBIVENT RESPIMAT)  MCG/ACT AERS inhaler Inhale 1 puff into the lungs every 6 hours as needed      LORazepam (ATIVAN) 0.5 MG tablet Take 0.5 mg by mouth 2 times daily as needed. nitroGLYCERIN (NITROSTAT) 0.4 MG SL tablet Place 0.4 mg under the tongue      oxybutynin (DITROPAN) 5 MG tablet TAKE 1 TABLET AT NIGHT       No current facility-administered medications for this visit.         Past Medical History:   Diagnosis Date    Anxiety     Arthritis     osteo    Autoimmune disease ([facility]     fibromyalgia    Chronic pain     fibromyalgia    Depression     GERD (gastroesophageal reflux disease)     nexium, well controlled    Hypertension     Nausea & vomiting     with earlier surgeries had N & V recent ones has done betterr    Neuropathy     Other ill-defined conditions(799.89)     ruptured /bulging disc neck &low back    Psychiatric disorder     depression  &  anxiety on cymbalta & Ativa    Seizures ([facility]     seizure as baby; hasnt had one since per pt    Thromboembolus ([facility]     Unspecified adverse effect of anesthesia     emerges tearful and anxious    Unspecified sleep apnea     uses 2L/ 02 at night no cpap or bipap    Vascular dementia [facility])         Social History     Socioeconomic History    Marital status:      Spouse name: Not on file    Number of children: Not on file    Years of education: Not on file    Highest education level: Not on file   Occupational History    Not on file   Tobacco Use    Smoking status: Every Day     Packs/day: 0.50     Types: Cigarettes    Smokeless tobacco: Never   Substance and Sexual Activity    Alcohol use: Yes    Drug use: No    Sexual activity: Not on file   Other Topics Concern    Not on file   Social History Narrative    Not on file     Social Determinants of Health     Financial Resource Strain: Unknown    Difficulty of Paying Living Expenses: Patient refused   Food Insecurity: Unknown    Worried About [address] [employer] in the Last Year: Patient refused    [address] in the Last Year: Patient refused   Transportation Needs: Unknown    Lack of Transportation (Medical): Not on file    Lack of Transportation (Non-Medical): Patient refused   Physical Activity: Not on file   Stress: Not on file   Social Connections: Not on file   Intimate Partner Violence: Not on file   Housing Stability: Unknown    Unable to Pay for Housing in the Last Year: Not on file    Number of Places Lived in the Last Year: Not on file    Unstable Housing in the Last Year: Patient refused            Review of Systems    Review of Systems   Constitutional:  Positive for fatigue (with walking). Negative for chills, fever and unexpected weight change. Respiratory:  Positive for cough (chronic), shortness of breath (with exertion) and wheezing (chronic). Cardiovascular:  Negative for chest pain. Gastrointestinal:  Negative for abdominal pain. Musculoskeletal:  Positive for back pain (chronic). Neurological:  Positive for weakness (leg weakness bilateral with walking). Negative for numbness. All other systems reviewed and are negative.     BP (!) 115/57 (Site: Left Upper Arm, Position: Sitting, Cuff Size: Large Adult)   Pulse 92   Temp 97.5 °F (36.4 °C) (Skin)   Resp 17   Ht 5' 5\" (1.651 m)   Wt 121 lb (54.9 kg)   SpO2 93%   BMI 20.14 kg/m²       Physical Exam    Physical Exam  Constitutional:       General: She is not in acute distress. Comments: Frail, chronically ill appearing in wc   Cardiovascular:      Rate and Rhythm: Normal rate and regular rhythm. Heart sounds: Normal heart sounds. Pulmonary:      Effort: Pulmonary effort is normal.      Breath sounds: Wheezing present. Musculoskeletal:      Comments: Pt unable to stand to ambulate to chair for lab draw without 1 person assist.    Skin:     General: Skin is warm and dry. Neurological:      Mental Status: She is alert and oriented to person, place, and time. Assessment & Plan    Khai Blandon was seen today for follow-up, hypertension and cholesterol problem. Diagnoses and all orders for this visit:    Pure hypercholesterolemia  -     Lipid Panel; Future  -     Lipid Panel    Essential (primary) hypertension  -     CBC with Auto Differential; Future  -     Basic Metabolic Panel; Future  -     Basic Metabolic Panel  -     CBC with Auto Differential    Degenerative lumbar spinal stenosis  -     DME SUPPLY ORDER    Chronic obstructive pulmonary disease, unspecified COPD type (HCC)  -     benzonatate (TESSALON) 200 MG capsule; Take 1 capsule by mouth 3 times daily as needed for Cough TAKE 1 CAPSULE BY MOUTH THREE TIMES DAILY AS NEEDED FOR COUGH    Nicotine dependence, cigarettes, uncomplicated    Hypokalemia  -     potassium chloride (KLOR-CON M) 20 MEQ extended release tablet; Take 1 tablet by mouth daily TAKE 1 TABLET TWICE A DAY    Nausea  -     ondansetron (ZOFRAN) 4 MG tablet; Take 1 tablet by mouth every 8 hours as needed for Nausea    Encounter for long-term (current) use of medications     Will do labs today, as above, bp is controlled on amlodipine 2.5mg and is also reportedly controlled at home. Will write for dme/wheelchair due to her lumbar disc disease/spinal stenosis and severe copd with eddy and decreased stamina. She is going to try to cut back on cigarettes, she has tried nicotine patch, chantix, wellbutrin and states no success. Advised she could try the gum or lozenges. Will continue same medcations- statin and potassium for now until we get result back on lab work. Will follow up by phone with results and further plans. Also, has appt with pcp in April, will keep this appt. Orders Placed This Encounter   Procedures    Lipid Panel     Standing Status:   Future     Number of Occurrences:   1     Standing Expiration Date:   2/17/2024     Order Specific Question:   Is Patient Fasting?/# of Hours     Answer:   na     Order Specific Question:   Has the patient fasted? Answer:   No    CBC with Auto Differential     Standing Status:   Future     Number of Occurrences:   1     Standing Expiration Date:   2/73/9803    Basic Metabolic Panel     Standing Status:   Future     Number of Occurrences:   1     Standing Expiration Date:   2/17/2024    DME SUPPLY ORDER     - DME device ordered - wheelchair   - Diagnosis: severe end stage copd resulting in severe eddy  - Length of Need: Lifetime           No follow-up provider specified.         RENZO Valdovinos NP

## 2023-02-18 LAB
ANION GAP SERPL CALC-SCNC: 4 MMOL/L (ref 2–11)
BUN SERPL-MCNC: 12 MG/DL (ref 8–23)
CALCIUM SERPL-MCNC: 8.7 MG/DL (ref 8.3–10.4)
CHLORIDE SERPL-SCNC: 104 MMOL/L (ref 101–110)
CHOLEST SERPL-MCNC: 118 MG/DL
CO2 SERPL-SCNC: 28 MMOL/L (ref 21–32)
CREAT SERPL-MCNC: 0.6 MG/DL (ref 0.6–1)
GLUCOSE SERPL-MCNC: 83 MG/DL (ref 65–100)
HDLC SERPL-MCNC: 64 MG/DL (ref 40–60)
HDLC SERPL: 1.8
LDLC SERPL CALC-MCNC: 40 MG/DL
POTASSIUM SERPL-SCNC: 3.8 MMOL/L (ref 3.5–5.1)
SODIUM SERPL-SCNC: 136 MMOL/L (ref 133–143)
TRIGL SERPL-MCNC: 70 MG/DL (ref 35–150)
VLDLC SERPL CALC-MCNC: 14 MG/DL (ref 6–23)

## 2023-02-21 ENCOUNTER — TELEPHONE (OUTPATIENT)
Dept: INTERNAL MEDICINE CLINIC | Facility: CLINIC | Age: 78
End: 2023-02-21

## 2023-03-09 DIAGNOSIS — M79.7 FIBROMYALGIA: ICD-10-CM

## 2023-03-09 DIAGNOSIS — G89.4 CHRONIC PAIN SYNDROME: ICD-10-CM

## 2023-03-09 RX ORDER — PREGABALIN 100 MG/1
CAPSULE ORAL
Qty: 90 CAPSULE | Refills: 0 | Status: SHIPPED | OUTPATIENT
Start: 2023-03-09 | End: 2023-04-06

## 2023-03-09 NOTE — TELEPHONE ENCOUNTER
Medication Refill Request      Name of Medication : Lyrica       Strength of Medication: 100 mg      Directions:  Take 1 tablet by mouth 3 times a day as needed for pain      30 day or 90 day supply: 90      Preferred Pharmacy: [facility] in [place]

## 2023-03-26 DIAGNOSIS — E87.6 HYPOKALEMIA: ICD-10-CM

## 2023-03-27 RX ORDER — POTASSIUM CHLORIDE 20 MEQ/1
TABLET, EXTENDED RELEASE ORAL
Qty: 180 TABLET | Refills: 0 | Status: SHIPPED | OUTPATIENT
Start: 2023-03-27

## 2023-03-31 DIAGNOSIS — M79.7 FIBROMYALGIA: ICD-10-CM

## 2023-03-31 DIAGNOSIS — G89.4 CHRONIC PAIN SYNDROME: ICD-10-CM

## 2023-03-31 RX ORDER — PREGABALIN 100 MG/1
CAPSULE ORAL
Qty: 90 CAPSULE | OUTPATIENT
Start: 2023-03-31

## 2023-05-09 DIAGNOSIS — M79.7 FIBROMYALGIA: ICD-10-CM

## 2023-05-09 DIAGNOSIS — J44.9 CHRONIC OBSTRUCTIVE PULMONARY DISEASE, UNSPECIFIED COPD TYPE (HCC): ICD-10-CM

## 2023-05-09 DIAGNOSIS — G89.4 CHRONIC PAIN SYNDROME: ICD-10-CM

## 2023-05-10 RX ORDER — BENZONATATE 200 MG/1
200 CAPSULE ORAL 3 TIMES DAILY PRN
Qty: 30 CAPSULE | Refills: 0 | Status: SHIPPED | OUTPATIENT
Start: 2023-05-10 | End: 2023-06-12 | Stop reason: SDUPTHER

## 2023-05-10 RX ORDER — PREGABALIN 100 MG/1
CAPSULE ORAL
Qty: 90 CAPSULE | Refills: 0 | Status: SHIPPED | OUTPATIENT
Start: 2023-05-10 | End: 2023-06-12 | Stop reason: SDUPTHER

## 2023-05-23 ENCOUNTER — TELEPHONE (OUTPATIENT)
Dept: INTERNAL MEDICINE CLINIC | Facility: CLINIC | Age: 78
End: 2023-05-23

## 2023-06-12 DIAGNOSIS — G89.4 CHRONIC PAIN SYNDROME: ICD-10-CM

## 2023-06-12 DIAGNOSIS — M79.7 FIBROMYALGIA: ICD-10-CM

## 2023-06-12 RX ORDER — MEMANTINE HYDROCHLORIDE 28 MG/1
28 CAPSULE, EXTENDED RELEASE ORAL DAILY
Qty: 90 CAPSULE | Refills: 3 | OUTPATIENT
Start: 2023-06-12

## 2023-06-12 RX ORDER — PREGABALIN 100 MG/1
CAPSULE ORAL
Qty: 90 CAPSULE | Refills: 0 | OUTPATIENT
Start: 2023-06-12 | End: 2023-07-09

## 2023-06-12 RX ORDER — OXYBUTYNIN CHLORIDE 5 MG/1
TABLET ORAL
Qty: 90 TABLET | OUTPATIENT
Start: 2023-06-12

## 2023-06-12 RX ORDER — PREDNISONE 5 MG/1
5 TABLET ORAL DAILY
Qty: 90 TABLET | Refills: 0 | OUTPATIENT
Start: 2023-06-12

## 2023-06-13 DIAGNOSIS — R30.0 DYSURIA: Primary | ICD-10-CM

## 2023-06-14 ENCOUNTER — NURSE ONLY (OUTPATIENT)
Dept: INTERNAL MEDICINE CLINIC | Facility: CLINIC | Age: 78
End: 2023-06-14

## 2023-06-14 DIAGNOSIS — R30.0 DYSURIA: ICD-10-CM

## 2023-06-14 LAB
AMORPH CRY URNS QL MICRO: ABNORMAL
APPEARANCE UR: ABNORMAL
BACTERIA URNS QL MICRO: ABNORMAL /HPF
BILIRUB UR QL: NEGATIVE
COLOR UR: ABNORMAL
EPI CELLS #/AREA URNS HPF: ABNORMAL /HPF
GLUCOSE UR STRIP.AUTO-MCNC: NEGATIVE MG/DL
HGB UR QL STRIP: ABNORMAL
KETONES UR QL STRIP.AUTO: NEGATIVE MG/DL
LEUKOCYTE ESTERASE UR QL STRIP.AUTO: ABNORMAL
NITRITE UR QL STRIP.AUTO: POSITIVE
PH UR STRIP: 6 (ref 5–9)
PROT UR STRIP-MCNC: ABNORMAL MG/DL
RBC #/AREA URNS HPF: ABNORMAL /HPF
SP GR UR REFRACTOMETRY: 1.01 (ref 1–1.02)
UROBILINOGEN UR QL STRIP.AUTO: 1 EU/DL (ref 0.2–1)
WBC URNS QL MICRO: >100 /HPF

## 2023-06-15 RX ORDER — NITROFURANTOIN 25; 75 MG/1; MG/1
100 CAPSULE ORAL 2 TIMES DAILY
Qty: 10 CAPSULE | Refills: 0 | Status: SHIPPED | OUTPATIENT
Start: 2023-06-15 | End: 2023-06-20

## 2023-06-16 LAB
BACTERIA SPEC CULT: ABNORMAL
BACTERIA SPEC CULT: ABNORMAL
SERVICE CMNT-IMP: ABNORMAL

## 2023-06-19 ENCOUNTER — TELEPHONE (OUTPATIENT)
Dept: INTERNAL MEDICINE CLINIC | Facility: CLINIC | Age: 78
End: 2023-06-19

## 2023-06-24 DIAGNOSIS — E87.6 HYPOKALEMIA: ICD-10-CM

## 2023-06-26 RX ORDER — POTASSIUM CHLORIDE 20 MEQ/1
TABLET, EXTENDED RELEASE ORAL
Qty: 180 TABLET | Refills: 0 | Status: SHIPPED | OUTPATIENT
Start: 2023-06-26

## 2023-06-26 RX ORDER — MEMANTINE HYDROCHLORIDE 28 MG/1
CAPSULE, EXTENDED RELEASE ORAL
Qty: 90 CAPSULE | Refills: 0 | Status: SHIPPED | OUTPATIENT
Start: 2023-06-26

## 2023-07-12 DIAGNOSIS — M79.7 FIBROMYALGIA: ICD-10-CM

## 2023-07-12 DIAGNOSIS — G89.4 CHRONIC PAIN SYNDROME: ICD-10-CM

## 2023-07-12 RX ORDER — PREGABALIN 100 MG/1
CAPSULE ORAL
Qty: 90 CAPSULE | Refills: 0 | Status: SHIPPED | OUTPATIENT
Start: 2023-07-12 | End: 2023-08-12

## 2023-07-12 RX ORDER — PREGABALIN 100 MG/1
CAPSULE ORAL
Qty: 90 CAPSULE | Refills: 0 | OUTPATIENT
Start: 2023-07-12 | End: 2023-08-08

## 2024-11-13 ENCOUNTER — TELEPHONE (OUTPATIENT)
Dept: OTHER | Facility: CLINIC | Age: 79
End: 2024-11-13

## 2024-11-13 NOTE — TELEPHONE ENCOUNTER
Patient was outreached to as part of Population Health scheduling activity. Patient may be due for a wellness visit with their primary care provider.    Outcome of call: PATIENT UNAVAILABLE - UNABLE TO LEAVE VOICEMAIL    Numbers in chart are family members and not patient

## (undated) DEVICE — CANNULA NSL ORAL AD FOR CAPNOFLEX CO2 O2 AIRLFE

## (undated) DEVICE — CONTAINER PREFIL FRMLN 40ML --

## (undated) DEVICE — SYR 50ML SLIP TIP NSAF LF STRL --

## (undated) DEVICE — SYR 5ML 1/5 GRAD LL NSAF LF --

## (undated) DEVICE — FORCEPS BX L240CM JAW DIA2.8MM L CAP W/ NDL MIC MESH TOOTH

## (undated) DEVICE — NDL PRT INJ NSAF BLNT 18GX1.5 --

## (undated) DEVICE — KENDALL RADIOLUCENT FOAM MONITORING ELECTRODE RECTANGULAR SHAPE: Brand: KENDALL

## (undated) DEVICE — CONNECTOR TBNG OD5-7MM O2 END DISP

## (undated) DEVICE — SYR 3ML LL TIP 1/10ML GRAD --

## (undated) DEVICE — BLOCK BITE AD 60FR W/ VELC STRP ADDRESSES MOST PT AND